# Patient Record
Sex: FEMALE | Race: BLACK OR AFRICAN AMERICAN | URBAN - METROPOLITAN AREA
[De-identification: names, ages, dates, MRNs, and addresses within clinical notes are randomized per-mention and may not be internally consistent; named-entity substitution may affect disease eponyms.]

---

## 2024-07-26 ENCOUNTER — HOSPITAL ENCOUNTER (OUTPATIENT)
Age: 19
Setting detail: SPECIMEN
Discharge: HOME | End: 2024-07-26

## 2024-07-26 ENCOUNTER — OFFICE VISIT (OUTPATIENT)
Age: 19
End: 2024-07-26
Payer: COMMERCIAL

## 2024-07-26 VITALS — HEIGHT: 64 IN | TEMPERATURE: 98.3 F | RESPIRATION RATE: 16 BRPM | BODY MASS INDEX: 25.44 KG/M2 | WEIGHT: 149 LBS

## 2024-07-26 DIAGNOSIS — Z00.00 ENCOUNTER FOR WELL WOMAN EXAM WITHOUT GYNECOLOGICAL EXAM: Primary | ICD-10-CM

## 2024-07-26 DIAGNOSIS — Z00.00 ENCOUNTER FOR WELL WOMAN EXAM WITHOUT GYNECOLOGICAL EXAM: ICD-10-CM

## 2024-07-26 DIAGNOSIS — D50.0 IRON DEFICIENCY ANEMIA DUE TO CHRONIC BLOOD LOSS: ICD-10-CM

## 2024-07-26 DIAGNOSIS — Z78.9 NON-SMOKER: ICD-10-CM

## 2024-07-26 LAB
ANION GAP SERPL CALCULATED.3IONS-SCNC: 12 MMOL/L (ref 9–16)
BASOPHILS # BLD: 0.03 K/UL (ref 0–0.2)
BASOPHILS NFR BLD: 1 % (ref 0–2)
BUN SERPL-MCNC: 13 MG/DL (ref 6–20)
CALCIUM SERPL-MCNC: 9.2 MG/DL (ref 8.6–10.4)
CHLORIDE SERPL-SCNC: 105 MMOL/L (ref 98–107)
CO2 SERPL-SCNC: 23 MMOL/L (ref 20–31)
CREAT SERPL-MCNC: 0.8 MG/DL (ref 0.5–0.9)
EOSINOPHIL # BLD: 0.07 K/UL (ref 0–0.44)
EOSINOPHILS RELATIVE PERCENT: 2 % (ref 1–4)
ERYTHROCYTE [DISTWIDTH] IN BLOOD BY AUTOMATED COUNT: 14.5 % (ref 11.8–14.4)
FERRITIN SERPL-MCNC: 13 NG/ML (ref 13–150)
GFR, ESTIMATED: >90 ML/MIN/1.73M2
GLUCOSE SERPL-MCNC: 91 MG/DL (ref 74–99)
HCT VFR BLD AUTO: 32.8 % (ref 36.3–47.1)
HGB BLD-MCNC: 10.1 G/DL (ref 11.9–15.1)
IMM GRANULOCYTES # BLD AUTO: <0.03 K/UL (ref 0–0.3)
IMM GRANULOCYTES NFR BLD: 0 %
IRON SATN MFR SERPL: 9 % (ref 20–55)
IRON SERPL-MCNC: 32 UG/DL (ref 37–145)
LYMPHOCYTES NFR BLD: 0.98 K/UL (ref 1.1–3.7)
LYMPHOCYTES RELATIVE PERCENT: 31 % (ref 25–45)
MCH RBC QN AUTO: 24.6 PG (ref 25.2–33.5)
MCHC RBC AUTO-ENTMCNC: 30.8 G/DL (ref 28.4–34.8)
MCV RBC AUTO: 79.8 FL (ref 82.6–102.9)
MONOCYTES NFR BLD: 0.35 K/UL (ref 0.1–1.4)
MONOCYTES NFR BLD: 11 % (ref 2–8)
NEUTROPHILS NFR BLD: 55 % (ref 34–64)
NEUTS SEG NFR BLD: 1.77 K/UL (ref 1.5–8.1)
NRBC BLD-RTO: 0 PER 100 WBC
PLATELET # BLD AUTO: 301 K/UL (ref 138–453)
PMV BLD AUTO: 11.7 FL (ref 8.1–13.5)
POTASSIUM SERPL-SCNC: 3.9 MMOL/L (ref 3.7–5.3)
RBC # BLD AUTO: 4.11 M/UL (ref 3.95–5.11)
RBC # BLD: ABNORMAL 10*6/UL
SODIUM SERPL-SCNC: 140 MMOL/L (ref 136–145)
TIBC SERPL-MCNC: 371 UG/DL (ref 250–450)
UNSATURATED IRON BINDING CAPACITY: 339 UG/DL (ref 112–347)
WBC OTHER # BLD: 3.2 K/UL (ref 4.5–13.5)

## 2024-07-26 PROCEDURE — 99395 PREV VISIT EST AGE 18-39: CPT

## 2024-07-26 RX ORDER — FERROUS SULFATE 325(65) MG
325 TABLET ORAL
COMMUNITY

## 2024-07-26 SDOH — ECONOMIC STABILITY: FOOD INSECURITY: WITHIN THE PAST 12 MONTHS, THE FOOD YOU BOUGHT JUST DIDN'T LAST AND YOU DIDN'T HAVE MONEY TO GET MORE.: NEVER TRUE

## 2024-07-26 SDOH — ECONOMIC STABILITY: HOUSING INSECURITY
IN THE LAST 12 MONTHS, WAS THERE A TIME WHEN YOU DID NOT HAVE A STEADY PLACE TO SLEEP OR SLEPT IN A SHELTER (INCLUDING NOW)?: NO

## 2024-07-26 SDOH — ECONOMIC STABILITY: FOOD INSECURITY: WITHIN THE PAST 12 MONTHS, YOU WORRIED THAT YOUR FOOD WOULD RUN OUT BEFORE YOU GOT MONEY TO BUY MORE.: NEVER TRUE

## 2024-07-26 SDOH — ECONOMIC STABILITY: INCOME INSECURITY: HOW HARD IS IT FOR YOU TO PAY FOR THE VERY BASICS LIKE FOOD, HOUSING, MEDICAL CARE, AND HEATING?: NOT HARD AT ALL

## 2024-07-26 ASSESSMENT — PATIENT HEALTH QUESTIONNAIRE - PHQ9
SUM OF ALL RESPONSES TO PHQ QUESTIONS 1-9: 0
1. LITTLE INTEREST OR PLEASURE IN DOING THINGS: NOT AT ALL
SUM OF ALL RESPONSES TO PHQ QUESTIONS 1-9: 0
2. FEELING DOWN, DEPRESSED OR HOPELESS: NOT AT ALL
SUM OF ALL RESPONSES TO PHQ9 QUESTIONS 1 & 2: 0

## 2024-07-26 NOTE — PATIENT INSTRUCTIONS
Thank you for coming into the clinic today  -As discussed I would like to schedule a Pap, this is screening guidelines that starts at the age of 21 years old for females to rule out cervical cancer  -We will order lab work to check on your iron deficiency anemia  -For your constipation I would recommend a magnesium oxide supplement at night, you can take 400 mcg nightly.  Magnesium has beneficial effects can be used for migraine prevention, and as a sleep aid as well as for constipation  -You can purchase magnesium oxide tablets at [employer] for best price   -For any supplements you take over-the-counter I would recommend researching on [employer], this is a third-party website that test for purity and accuracy when it comes to labels and heavy metals  -If you develop pains in your right hip, knee, or ankles due to your limp please make an appointment to get established with our osteopathic team so they can perform an assessment of your gait analysis  -Please be sure to exercise at least 3 times weekly 30 minutes per session  -Please be sure to eat a healthy balanced diet, I will provide a handout on the DASH diet and the Mediterranean diet  -If you develop any anxiety or depression I hope that you would feel comfortable enough to speak with us, we do have an in office psychologist Dr. Ankush Cooper if we ever need to set up a therapy session.  I am also certain that [facility] provides free therapy sessions to its students if needed.  You can always use that as a resource  -Please be sure to sign up for [employer] so that you can send us messages as needed  -If you develop severe pains during menstruation, ibuprofen is best for pain.  If you have any stomach irritation or history of ulcers I would recommend using Tylenol  -Please feel free to send us any nonurgent medical questions through my portal or call to speak with the on-call physician for more urgent medical question

## 2024-07-26 NOTE — PROGRESS NOTES
[facility]  [address] [place] [address]  Phone: [phone]  Fax: [phone]    New Patient      Date of Visit:  2024  Patient Name: Darlyn Mclaughlin   Patient :  2005     HPI:     Darlyn Mclaughlin is a 19 y.o. female with a PMH of iron deficiency anemia who presents today to  establish care.    Patient is coming in today to establish care.  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  School is stressful but she feels she has good control in terms of her mood.  She is not sexually active and has never been sexually active.  She is not complaining of any significant symptoms.  She does take iron pills because she was found to be iron deficient while living in [place] under the care of her pediatrician.  She was established with the pediatrician and is now transitioning to adult care. She reports the iron pills cause her to feel constipated.  We discussed the use of either MiraLAX, Metamucil fiber supplement, or magnesium oxide supplements at night.  Patient reports that her mother mentions she walks with a limp.  Patient reports this is intermittent and reports having surgery on her right leg as a child.  She denies any pain in her right ankle, knee, or hips. Patient reports regular menstrual cycles every 30 days.  She had her menstrual cycle at the age of 11.  She reports no irregularities.  She does report her menses is heavy.  Patient reports she is currently eating out until she gets established at her new place.        REVIEW OF SYSTEM      Review of Systems   Constitutional:  Negative for chills, fatigue and fever.   HENT:  Negative for sore throat.    Respiratory:  Negative for cough, shortness of breath and wheezing.    Cardiovascular:  Negative for chest pain and palpitations.   Gastrointestinal:  Negative for abdominal pain, diarrhea, nausea and vomiting.   Genitourinary:  Negative for dysuria, frequency and

## 2024-07-29 ENCOUNTER — TELEPHONE (OUTPATIENT)
Age: 19
End: 2024-07-29

## 2024-07-29 DIAGNOSIS — D50.0 IRON DEFICIENCY ANEMIA DUE TO CHRONIC BLOOD LOSS: Primary | ICD-10-CM

## 2024-07-29 NOTE — TELEPHONE ENCOUNTER
Ordered CBC to be completed in 6 months to monitor iron deficiency anemia.  Patient was instructed to take iron every other day along with magnesium oxide to combat constipation.  The every other dosing should reduce symptoms of constipation.  Magnesium should also help with this.  Patient was sent results through [employer].    -Salah Awadalla, MD